# Patient Record
Sex: MALE | Race: WHITE
[De-identification: names, ages, dates, MRNs, and addresses within clinical notes are randomized per-mention and may not be internally consistent; named-entity substitution may affect disease eponyms.]

---

## 2020-08-15 ENCOUNTER — HOSPITAL ENCOUNTER (INPATIENT)
Dept: HOSPITAL 63 - GEROPSY | Age: 75
LOS: 11 days | Discharge: HOME | DRG: 885 | End: 2020-08-26
Attending: PSYCHIATRY & NEUROLOGY | Admitting: PSYCHIATRY & NEUROLOGY
Payer: OTHER GOVERNMENT

## 2020-08-15 VITALS — SYSTOLIC BLOOD PRESSURE: 115 MMHG | DIASTOLIC BLOOD PRESSURE: 55 MMHG

## 2020-08-15 VITALS — DIASTOLIC BLOOD PRESSURE: 55 MMHG | SYSTOLIC BLOOD PRESSURE: 110 MMHG

## 2020-08-15 VITALS — WEIGHT: 140.21 LBS | HEIGHT: 66 IN | BODY MASS INDEX: 22.53 KG/M2

## 2020-08-15 DIAGNOSIS — Z88.8: ICD-10-CM

## 2020-08-15 DIAGNOSIS — E78.5: ICD-10-CM

## 2020-08-15 DIAGNOSIS — N52.9: ICD-10-CM

## 2020-08-15 DIAGNOSIS — J44.9: ICD-10-CM

## 2020-08-15 DIAGNOSIS — I10: ICD-10-CM

## 2020-08-15 DIAGNOSIS — N40.0: ICD-10-CM

## 2020-08-15 DIAGNOSIS — R45.851: ICD-10-CM

## 2020-08-15 DIAGNOSIS — F33.3: Primary | ICD-10-CM

## 2020-08-15 DIAGNOSIS — F41.9: ICD-10-CM

## 2020-08-15 DIAGNOSIS — E11.9: ICD-10-CM

## 2020-08-15 DIAGNOSIS — Z85.828: ICD-10-CM

## 2020-08-15 DIAGNOSIS — I71.4: ICD-10-CM

## 2020-08-15 DIAGNOSIS — N28.9: ICD-10-CM

## 2020-08-15 DIAGNOSIS — Z79.899: ICD-10-CM

## 2020-08-15 DIAGNOSIS — Z20.828: ICD-10-CM

## 2020-08-15 LAB
ALBUMIN SERPL-MCNC: 3.6 G/DL (ref 3.4–5)
ALBUMIN/GLOB SERPL: 1 {RATIO} (ref 1–1.7)
ALP SERPL-CCNC: 85 U/L (ref 46–116)
ALT SERPL-CCNC: 21 U/L (ref 16–63)
ANION GAP SERPL CALC-SCNC: 10 MMOL/L (ref 6–14)
APTT PPP: YELLOW S
AST SERPL-CCNC: 17 U/L (ref 15–37)
BACTERIA #/AREA URNS HPF: 0 /HPF
BASOPHILS # BLD AUTO: 0 X10^3/UL (ref 0–0.2)
BASOPHILS NFR BLD: 0 % (ref 0–3)
BILIRUB SERPL-MCNC: 0.5 MG/DL (ref 0.2–1)
BILIRUB UR QL STRIP: (no result)
BUN/CREAT SERPL: 21 (ref 6–20)
CA-I SERPL ISE-MCNC: 30 MG/DL (ref 8–26)
CALCIUM SERPL-MCNC: 9 MG/DL (ref 8.5–10.1)
CHLORIDE SERPL-SCNC: 105 MMOL/L (ref 98–107)
CHOLEST/HDLC SERPL: 2 {RATIO}
CO2 SERPL-SCNC: 25 MMOL/L (ref 21–32)
CREAT SERPL-MCNC: 1.4 MG/DL (ref 0.7–1.3)
EOSINOPHIL NFR BLD: 0.1 X10^3/UL (ref 0–0.7)
EOSINOPHIL NFR BLD: 2 % (ref 0–3)
ERYTHROCYTE [DISTWIDTH] IN BLOOD BY AUTOMATED COUNT: 13.9 % (ref 11.5–14.5)
FIBRINOGEN PPP-MCNC: CLEAR MG/DL
GFR SERPLBLD BASED ON 1.73 SQ M-ARVRAT: 49.4 ML/MIN
GLOBULIN SER-MCNC: 3.5 G/DL (ref 2.2–3.8)
GLUCOSE SERPL-MCNC: 79 MG/DL (ref 70–99)
GLUCOSE UR STRIP-MCNC: (no result) MG/DL
HCT VFR BLD CALC: 45.4 % (ref 39–53)
HDLC SERPL-MCNC: 57 MG/DL (ref 40–60)
HGB BLD-MCNC: 15.3 G/DL (ref 13–17.5)
LDLC: 97 MG/DL (ref 0–100)
LYMPHOCYTES # BLD: 1.6 X10^3/UL (ref 1–4.8)
LYMPHOCYTES NFR BLD AUTO: 20 % (ref 24–48)
MAGNESIUM SERPL-MCNC: 2.1 MG/DL (ref 1.8–2.4)
MCH RBC QN AUTO: 33 PG (ref 25–35)
MCHC RBC AUTO-ENTMCNC: 34 G/DL (ref 31–37)
MCV RBC AUTO: 99 FL (ref 79–100)
MONO #: 0.9 X10^3/UL (ref 0–1.1)
MONOCYTES NFR BLD: 11 % (ref 0–9)
NEUT #: 5.5 X10^3UL (ref 1.8–7.7)
NEUTROPHILS NFR BLD AUTO: 67 % (ref 31–73)
NITRITE UR QL STRIP: (no result)
PLATELET # BLD AUTO: 111 X10^3/UL (ref 140–400)
POTASSIUM SERPL-SCNC: 4.2 MMOL/L (ref 3.5–5.1)
PROT SERPL-MCNC: 7.1 G/DL (ref 6.4–8.2)
RBC # BLD AUTO: 4.61 X10^6/UL (ref 4.3–5.7)
RBC #/AREA URNS HPF: (no result) /HPF (ref 0–2)
SODIUM SERPL-SCNC: 140 MMOL/L (ref 136–145)
SP GR UR STRIP: 1.02
SQUAMOUS #/AREA URNS LPF: (no result) /LPF
THYROID STIM HORMONE (TSH): 1.58 UIU/ML (ref 0.36–3.74)
TRIGL SERPL-MCNC: 84 MG/DL (ref 0–150)
UROBILINOGEN UR-MCNC: 0.2 MG/DL
VLDLC: 16 MG/DL (ref 0–40)
WBC # BLD AUTO: 8.2 X10^3/UL (ref 4–11)
WBC #/AREA URNS HPF: (no result) /HPF (ref 0–4)

## 2020-08-15 PROCEDURE — 81001 URINALYSIS AUTO W/SCOPE: CPT

## 2020-08-15 PROCEDURE — 84443 ASSAY THYROID STIM HORMONE: CPT

## 2020-08-15 PROCEDURE — 86592 SYPHILIS TEST NON-TREP QUAL: CPT

## 2020-08-15 PROCEDURE — 84480 ASSAY TRIIODOTHYRONINE (T3): CPT

## 2020-08-15 PROCEDURE — 83735 ASSAY OF MAGNESIUM: CPT

## 2020-08-15 PROCEDURE — 80053 COMPREHEN METABOLIC PANEL: CPT

## 2020-08-15 PROCEDURE — 82607 VITAMIN B-12: CPT

## 2020-08-15 PROCEDURE — 83036 HEMOGLOBIN GLYCOSYLATED A1C: CPT

## 2020-08-15 PROCEDURE — 85025 COMPLETE CBC W/AUTO DIFF WBC: CPT

## 2020-08-15 PROCEDURE — 93005 ELECTROCARDIOGRAM TRACING: CPT

## 2020-08-15 PROCEDURE — 80061 LIPID PANEL: CPT

## 2020-08-15 PROCEDURE — 82306 VITAMIN D 25 HYDROXY: CPT

## 2020-08-15 PROCEDURE — 83550 IRON BINDING TEST: CPT

## 2020-08-15 PROCEDURE — 83540 ASSAY OF IRON: CPT

## 2020-08-15 PROCEDURE — 84436 ASSAY OF TOTAL THYROXINE: CPT

## 2020-08-15 PROCEDURE — 82947 ASSAY GLUCOSE BLOOD QUANT: CPT

## 2020-08-15 PROCEDURE — 36415 COLL VENOUS BLD VENIPUNCTURE: CPT

## 2020-08-15 RX ADMIN — BUSPIRONE HYDROCHLORIDE SCH MG: 15 TABLET ORAL at 20:27

## 2020-08-15 NOTE — PDOC
Exam


Note:


Quan Note:


Please also refer to the separate dictated note~for this date of service 

dictated separately.~Patient seen individually. Discussed the patient with 

Nursing staff reviewed the chart.~Reviewed interim history and current 

functioning. Reviewed vital signs,~Labs/ Radiology~and current medications noted

below. Continue current treatment with the changes noted in the dictated 

addendum note





Assessment:


Vital Signs/I&O:





                                   Vital Signs








  Date Time  Temp Pulse Resp B/P (MAP) Pulse Ox O2 Delivery O2 Flow Rate FiO2


 


8/15/20 20:00 97.4       


 


8/15/20 15:45  66 16 110/55 (73) 94 Room Air  








Labs:





                                Laboratory Tests








Test


 8/15/20


11:35 8/15/20


11:50


 


White Blood Count


 8.2 x10^3/uL


(4.0-11.0) 





 


Red Blood Count


 4.61 x10^6/uL


(4.30-5.70) 





 


Hemoglobin


 15.3 g/dL


(13.0-17.5) 





 


Hematocrit


 45.4 %


(39.0-53.0) 





 


Mean Corpuscular Volume


 99 fL ()


 





 


Mean Corpuscular Hemoglobin 33 pg (25-35)   


 


Mean Corpuscular Hemoglobin


Concent 34 g/dL


(31-37) 





 


Red Cell Distribution Width


 13.9 %


(11.5-14.5) 





 


Platelet Count


 111 x10^3/uL


(140-400)  L 





 


Neutrophils (%) (Auto) 67 % (31-73)   


 


Lymphocytes (%) (Auto) 20 % (24-48)  L 


 


Monocytes (%) (Auto) 11 % (0-9)  H 


 


Eosinophils (%) (Auto) 2 % (0-3)   


 


Basophils (%) (Auto) 0 % (0-3)   


 


Neutrophils # (Auto)


 5.5 x10^3uL


(1.8-7.7) 





 


Lymphocytes # (Auto)


 1.6 x10^3/uL


(1.0-4.8) 





 


Monocytes # (Auto)


 0.9 x10^3/uL


(0.0-1.1) 





 


Eosinophils # (Auto)


 0.1 x10^3/uL


(0.0-0.7) 





 


Basophils # (Auto)


 0.0 x10^3/uL


(0.0-0.2) 





 


Sodium Level


 140 mmol/L


(136-145) 





 


Potassium Level


 4.2 mmol/L


(3.5-5.1) 





 


Chloride Level


 105 mmol/L


() 





 


Carbon Dioxide Level


 25 mmol/L


(21-32) 





 


Anion Gap 10 (6-14)   


 


Blood Urea Nitrogen


 30 mg/dL


(8-26)  H 





 


Creatinine


 1.4 mg/dL


(0.7-1.3)  H 





 


Estimated GFR


(Cockcroft-Gault) 49.4  


 





 


BUN/Creatinine Ratio 21 (6-20)  H 


 


Glucose Level


 79 mg/dL


(70-99) 





 


Calcium Level


 9.0 mg/dL


(8.5-10.1) 





 


Magnesium Level


 2.1 mg/dL


(1.8-2.4) 





 


Iron Level


 87 ug/dL


() 





 


Total Iron Binding Capacity


 288 ug/dL


(250-450) 





 


Iron Saturation 30 % (15-34)   


 


Total Bilirubin


 0.5 mg/dL


(0.2-1.0) 





 


Aspartate Amino Transferase


(AST) 17 U/L (15-37)


 





 


Alanine Aminotransferase (ALT)


 21 U/L (16-63)


 





 


Alkaline Phosphatase


 85 U/L


() 





 


Total Protein


 7.1 g/dL


(6.4-8.2) 





 


Albumin


 3.6 g/dL


(3.4-5.0) 





 


Albumin/Globulin Ratio 1.0 (1.0-1.7)   


 


Triglycerides Level


 84 mg/dL


(0-150) 





 


Cholesterol Level


 170 mg/dL


(0-200) 





 


LDL Cholesterol, Calculated


 97 mg/dL


(0-100) 





 


VLDL Cholesterol, Calculated


 16 mg/dL


(0-40) 





 


Non-HDL Cholesterol Calculated


 113 mg/dL


(0-129) 





 


HDL Cholesterol


 57 mg/dL


(40-60) 





 


Cholesterol/HDL Ratio 2.0   


 


Thyroid Stimulating Hormone


(TSH) 1.582 uIU/mL


(0.358-3.740) 





 


Urine Collection Type  Unknown  


 


Urine Color  Yellow  


 


Urine Clarity  Clear  


 


Urine pH  6.0  


 


Urine Specific Gravity  1.020  


 


Urine Protein


 


 Neg


(NEG-TRACE)


 


Urine Glucose (UA)


 


 Neg mg/dL


(NEG)


 


Urine Ketones (Stick)


 


 Neg mg/dL


(NEG)


 


Urine Blood  Trace (NEG)  


 


Urine Nitrite  Neg (NEG)  


 


Urine Bilirubin  Neg (NEG)  


 


Urine Urobilinogen Dipstick


 


 0.2 mg/dL (0.2


mg/dL)


 


Urine Leukocyte Esterase  Neg (NEG)  


 


Urine RBC


 


 Occ /HPF (0-2)





 


Urine WBC


 


 Occ /HPF (0-4)





 


Urine Squamous Epithelial


Cells 


 Occ /LPF  





 


Urine Bacteria


 


 0 /HPF (0-FEW)





 


Urine Mucus  Slight /LPF  











Current Medications:


Meds:





Current Medications








 Medications


  (Trade)  Dose


 Ordered  Sig/Ammy


 Route


 PRN Reason  Start Time


 Stop Time Status Last Admin


Dose Admin


 


 Lorazepam


  (Ativan)  0.5 mg  BID


 PO


   8/15/20 21:00


    8/15/20 20:27





 


 Buspirone HCl


  (Buspar)  30 mg  BID


 PO


   8/15/20 21:00


    8/15/20 20:27











I have reviewed the current psychotropics carefully including drug interactions.

 Risk benefit ratio favors no change other than as noted in my dictated progress

note.





Diagnosis:


Problems:  


(1) MDD (major depressive disorder)


(2) Anxiety disorder, unspecified











DAVE WAN MD                 Aug 15, 2020 22:29

## 2020-08-15 NOTE — CONS
DATE OF CONSULTATION:  08/15/2020



REASON FOR CONSULTATION:  Medical management.



HISTORY OF PRESENT ILLNESS:  The patient is a 74-year-old  male patient

who was referred [facility] from [facility] on account of being depressed, feeling antsy all the time, having

a total mental breakdown, cold sweats, sits in dark, fleeting thoughts of

suicide, difficulty concentration, all this in a background of major depressive

disorder.



PAST MEDICAL HISTORY:  Significant for male erectile dysfunction, essential

hypertension, hyperlipidemia, obstructive pulmonary disease, type 2 diabetes

mellitus, benign prostatic hypertrophy.  He has also abdominal aortic aneurysm.



PAST SURGICAL HISTORY:  Unremarkable except for removal of basal cell carcinoma

of his nose.



PAST PSYCHIATRIC HISTORY:  Significant for anxiety and depression.



ALLERGIES:  HE IS ALLERGIC TO LIPITOR AND ZETIA.



MEDICATIONS:  He is currently on following medications:  He is on Flomax 0.4 mg

daily, Crestor 40 mg at bedtime, lisinopril/hydrochlorothiazide 10/12.5 mg once

a day, aspirin 81 mg once a day, fluoxetine 10 mg daily, Abilify 2 mg daily,

lorazepam 0.5 mg twice a day, buspirone 30 mg twice a day, cholecalciferol 25

mcg once a day and multivitamin with mineral 1 tablet once a day.



FAMILY HISTORY:  Noncontributory.



SOCIAL HISTORY:  He is , lives with his wife.  He has 2 sons, 1  at

the age of 17 because of leukemia.  The other son lives in [facility] and has

3 sons and a daughter.  He is vaping, although trying to quit that.  He does not

drink alcohol or use recreational drugs.  He is retired and used to own a

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

equipment.



REVIEW OF SYSTEMS:  As per history of present illness.



PHYSICAL EXAMINATION

GENERAL:  When I saw him this afternoon, he was sitting comfortably in his

chair, in no apparent respiratory distress.  There was no pallor, jaundice,

cyanosis or thyromegaly.  No jugular venous distention.  No limb edema.

VITAL SIGNS:  His heart rate was 66, blood pressure was 110/55, temperature

97.1, respiratory rate was 16 and oxygen saturation was 94% on room air.

HEAD, EYES, EARS, NOSE AND THROAT:  Showed normocephalic, atraumatic.

NECK:  Supple.

HEART:  Showed normal first and second heart sounds.  No gallop, rub or murmur.

CHEST:  Clear to auscultation.  No crepitation or rhonchi.

ABDOMEN:  Distended, soft, nontender.  No guarding or rigidity.  No

organomegaly.  All hernial orifices intact.  Bowel sounds normal.

NEUROLOGIC:  He was awake, alert, responding appropriately.  All cranial nerves

intact.

EXTREMITIES:  He moves all extremities without difficulty, ambulates without

assistance or assistive devices.



LABORATORY DATA:  Showed white cell count of 8200, hemoglobin 15.3, hematocrit

45, MCV 99 and platelet count of 111,000.  His chemistry showed a serum sodium

140, potassium 4.2, chloride 105, bicarbonate 25, anion gap of 10, BUN 30,

creatinine 1.4, estimated GFR was 49 mL per minute.  His glucose was 79, calcium

was 9, magnesium was 2.1.  Serum iron was 87, TIBC was 288 and iron saturation

was 30.  His total bilirubin, AST, ALT, alkaline phosphatase were normal.  Total

protein was 7.1, albumin was 3.6.  His serum triglycerides were 84, total

cholesterol 170, LDL was 97, VLDL was 16, HDL was 57, the ratio was 2.  His TSH

was normal at 1.582.  His urinalysis showed the urine was yellow, clear with a

pH of 6, specific gravity of 1.020.  The urine was negative for protein,

glucose, ketones, trace of blood and negative for nitrite and bilirubin.  The

urine was negative for leukocyte esterase, occasional rbc's, occasional wbc's

and no bacteria.



IMPRESSION:  In summary, this is a 74-year-old  male patient who was

admitted on account of being depressed, feeling antsy all the time, having a

total mental breakdown, cold sweats, sits in the dark, fleeting thoughts of

suicide, difficulty concentrating, all this in a background of major depressive

disorder.  He has multiple medical problems including abdominal aortic aneurysm

that is about 5.5, hypertension, hyperlipidemia, chronic obstructive pulmonary

disease, type 2 diabetes and benign prostatic hypertrophy.  All in all, the

patient seemed to be medically stable.  All his vital signs are stable.  All his

lab work are within acceptable range.  He does have mild impaired kidney

function.



PLAN:  My plan is to obviously review all the labs that are still pending at the

time of this dictation and make any necessary recommendation.



Thank you, Dr. Meyer, for allowing me to participate in the care of this

patient.





______________________________

DEANN MORGAN MD



DR:  GIRISH/trerance  JOB#:  [id number] / [id number]

DD:  08/15/2020 16:38  DT:  08/15/2020 17:35

## 2020-08-15 NOTE — HP
ADMIT DATE:  08/15/2020



PSYCHIATRIC ADMISSION HISTORY/EVALUATION



This note covers elements not covered in my initial note 08/15/2020.



I met with the patient evening of 08/15/2020 on telehealth rounds with MIRLANDE Hu.  Previously discussed with Marie Cevallos, .



IDENTIFYING DATA:  The patient is a 74-year-old  male referred to us

from the [facility].  The patient resides at home, has been getting

increasingly depressed, anxious, and complains of feeling "antsy" all the

time.  He states he is having a "total mental breakdown."  He admits to having

cold sweats, sits in the dark, has fleeting suicidal thoughts, difficulty

concentrating.  His wife works at a flower shop during the day and he is alone

at home; ruminating, obsessive, depressed, hopeless, helpless, worthless.  [facility]

[facility]

referral.



CHIEF COMPLAINT:  "I am having a total mental breakdown."



HISTORY OF PRESENT ILLNESS:  The patient has a history of worsening symptoms of

depression, feeling hopeless, helpless, worthless, anxious with fleeting

suicidal ideation and obsessive thought processes.  No clear history of bipolar

disorder or homicidal ideation.  Cognitively, he has been reasonably intact.



PAST PSYCHIATRIC HISTORY:  The patient states he had a similar mental breakdown

many years ago.  He was treated on Ativan, did very well fairly quickly.



PAST MEDICAL HISTORY:  Positive for erectile dysfunction, abdominal aortic

aneurysm, basal cell carcinoma of the nose, hypertension, hyperlipidemia, COPD,

type 2 diabetes mellitus, BPH.



ALLERGIES:  To LIPITOR and ZETIA.



CODE STATUS:  Full code.



ACCU-CHEKS:  None.



DIET:  Regular.



Takes medications whole, ambulates up ad riki.



SOCIAL HISTORY:  No history of alcohol, drug abuse, physical, sexual or elder

abuse.  He is not known to be a perpetrator.  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

[employer] and would go to trade shows.



CURRENT PSYCHOTROPICS:  Abilify 2 mg a day, BuSpar 30 mg b.i.d., Prozac 10 mg a

day, Ativan 0.5 mg b.i.d.



FAMILY HISTORY:  Noncontributory.



REVIEW OF SYSTEMS:  Positive for anxiety, restlessness.  No CV, , pulmonary,

eye, ENT system symptoms on review.



MENTAL STATUS EXAMINATION:  The patient is reasonably oriented.  Speech is

coherent, abstraction fair, computation impaired, language function intact. 

Mood and affect is depressed, anxious.  No active suicidal ideation.  Attention

span is short.



IMPRESSION:  Major depressive disorder, recurrent; anxiety disorder,

unspecified.



PLAN:  Admit to Geropsychiatry Unit at [facility].  I will see the

patient daily individually from a psychiatric standpoint.  Medical followup with

Dr. Salgado/Dr. Blount.  Continue the patient on his current medications.  [facility]

[facility].  Consider increasing Prozac, stopping the

Abilify since he may be having some akathisia from this, maintaining BuSpar, may

consider using Seroquel for anxiety.  We will make further adjustments as

clinically indicated.



ESTIMATED LENGTH OF STAY:  10-12 days.



DISPOSITION:  Pl[facility] back [facility] when [facility].





______________________________

MAN SHAYNA WAN MD



DR:  ESPERANZA/terrance  JOB#:  [id number] / [id number]

DD:  08/15/2020 18:54  DT:  08/15/2020 19:28

## 2020-08-16 VITALS — SYSTOLIC BLOOD PRESSURE: 118 MMHG | DIASTOLIC BLOOD PRESSURE: 65 MMHG

## 2020-08-16 VITALS — SYSTOLIC BLOOD PRESSURE: 97 MMHG | DIASTOLIC BLOOD PRESSURE: 57 MMHG

## 2020-08-16 LAB
HBA1C MFR BLD: 6.2 % (ref 4.8–5.6)
T3 SERPL-MCNC: 78 NG/DL (ref 71–180)
T4 SERPL-MCNC: 5.8 UG/DL (ref 4.5–12)

## 2020-08-16 RX ADMIN — TAMSULOSIN HYDROCHLORIDE SCH MG: 0.4 CAPSULE ORAL at 07:50

## 2020-08-16 RX ADMIN — ASPIRIN SCH MG: 81 TABLET, COATED ORAL at 07:51

## 2020-08-16 RX ADMIN — BUSPIRONE HYDROCHLORIDE SCH MG: 15 TABLET ORAL at 07:48

## 2020-08-16 RX ADMIN — LISINOPRIL SCH MG: 10 TABLET ORAL at 07:51

## 2020-08-16 RX ADMIN — VITAMIN D, TAB 1000IU (100/BT) SCH UNIT: 25 TAB at 07:51

## 2020-08-16 RX ADMIN — I-VITE, TAB 1000-60-2MG (60/BT) SCH TAB: TAB at 07:51

## 2020-08-16 NOTE — PDOC
Exam


Note:


Quan Note:


Please also refer to the separate dictated note~for this date of service 

dictated separately.~Patient seen individually. Discussed the patient with 

Nursing staff reviewed the chart.~Reviewed interim history and current 

functioning. Reviewed vital signs,~Labs/ Radiology~and current medications noted

below. Continue current treatment with the changes noted in the dictated 

addendum note





Assessment:


Vital Signs/I&O:





                                   Vital Signs








  Date Time  Temp Pulse Resp B/P (MAP) Pulse Ox O2 Delivery O2 Flow Rate FiO2


 


8/16/20 15:53 97.4 71 16 97/57 (70) 93 Room Air  














                                    I & O   


 


 8/15/20 8/15/20 8/16/20





 15:00 23:00 07:00


 


Intake Total 180 ml 360 ml 


 


Balance 180 ml 360 ml 











Current Medications:


Meds:





Current Medications








 Medications


  (Trade)  Dose


 Ordered  Sig/Ammy


 Route


 PRN Reason  Start Time


 Stop Time Status Last Admin


Dose Admin


 


 Aripiprazole


  (Abilify)  2 mg  DAILY


 PO


   8/16/20 09:00


 8/16/20 18:28 DC 8/16/20 07:51





 


 Aspirin


  (Aspirin Enteric


 Coated)  81 mg  DAILY


 PO


   8/16/20 09:00


    8/16/20 07:51





 


 Vitamin D


  (Vitamin D3)  1,000 unit  DAILY


 PO


   8/16/20 09:00


    8/16/20 07:51





 


 Fluoxetine HCl


  (PROzac)  10 mg  DAILY


 PO


   8/16/20 09:00


 8/16/20 18:29 DC 8/16/20 07:51





 


 Tamsulosin HCl


  (Flomax)  0.4 mg  DAILY


 PO


   8/16/20 09:00


    8/16/20 07:50





 


 Multivitamins/


 Minerals


  (I-Terrence)  1 tab  DAILY


 PO


   8/16/20 09:00


    8/16/20 07:51





 


 Lisinopril


  (Prinivil)  10 mg  DAILY


 PO


   8/16/20 09:00


    8/16/20 07:51





 


 Hydrochlorothiazide


  (Microzide)  12.5 mg  DAILY


 PO


   8/16/20 09:00


    8/16/20 07:51











I have reviewed the current psychotropics carefully including drug interactions.

 Risk benefit ratio favors no change other than as noted in my dictated progress

note.





Diagnosis:


Problems:  


(1) Anxiety disorder, unspecified


(2) MDD (major depressive disorder)











DAVE WAN MD                 Aug 16, 2020 21:58

## 2020-08-17 VITALS — SYSTOLIC BLOOD PRESSURE: 106 MMHG | DIASTOLIC BLOOD PRESSURE: 57 MMHG

## 2020-08-17 VITALS — DIASTOLIC BLOOD PRESSURE: 54 MMHG | SYSTOLIC BLOOD PRESSURE: 112 MMHG

## 2020-08-17 RX ADMIN — VITAMIN D, TAB 1000IU (100/BT) SCH UNIT: 25 TAB at 09:15

## 2020-08-17 RX ADMIN — I-VITE, TAB 1000-60-2MG (60/BT) SCH TAB: TAB at 09:13

## 2020-08-17 RX ADMIN — ASPIRIN SCH MG: 81 TABLET, COATED ORAL at 09:13

## 2020-08-17 RX ADMIN — LISINOPRIL SCH MG: 10 TABLET ORAL at 09:00

## 2020-08-17 RX ADMIN — TAMSULOSIN HYDROCHLORIDE SCH MG: 0.4 CAPSULE ORAL at 09:13

## 2020-08-17 NOTE — PDOC
Exam


Note:


Quan Note:


Please also refer to the separate dictated note~for this date of service 

dictated separately.~Patient seen individually. Discussed the patient with 

Nursing staff reviewed the chart.~Reviewed interim history and current 

functioning. Reviewed vital signs,~Labs/ Radiology~and current medications noted

below. Continue current treatment with the changes noted in the dictated 

addendum note





Assessment:


Vital Signs/I&O:





                                   Vital Signs








  Date Time  Temp Pulse Resp B/P (MAP) Pulse Ox O2 Delivery O2 Flow Rate FiO2


 


8/17/20 20:08 97.8    95   


 


8/17/20 15:45  67 16 112/54 (73)    


 


8/16/20 15:53      Room Air  














                                    I & O   


 


 8/16/20 8/16/20 8/17/20





 15:00 23:00 07:00


 


Intake Total 600 ml 360 ml 


 


Balance 600 ml 360 ml 








Labs:





                                Laboratory Tests








Test


 8/17/20


07:56


 


Glucose (Fingerstick)


 79 mg/dL


(70-99)











Current Medications:


Meds:





Current Medications








 Medications


  (Trade)  Dose


 Ordered  Sig/Ammy


 Route


 PRN Reason  Start Time


 Stop Time Status Last Admin


Dose Admin


 


 Aripiprazole


  (Abilify)  1 mg  DAILY


 PO


   8/17/20 09:00


    8/17/20 09:17





 


 Fluoxetine HCl


  (PROzac)  20 mg  DAILY


 PO


   8/17/20 09:00


    8/17/20 09:18











I have reviewed the current psychotropics carefully including drug interactions.

 Risk benefit ratio favors no change other than as noted in my dictated progress

note.





Diagnosis:


Problems:  


(1) Anxiety disorder, unspecified


(2) MDD (major depressive disorder)











DAVE WAN MD                 Aug 17, 2020 22:10

## 2020-08-17 NOTE — PN
DATE:  08/16/2020



PSYCHIATRIC PROGRESS NOTE



This late entry 08/16/2020 covers elements not covered in my initial note.



SUBJECTIVE:  I met with the patient evening of 08/16/2020 on telehealth rounds

with MIRLANDE Hu.  The patient slept 6-3/4 hours previous night.  He has been

somewhat withdrawn, isolative, spends much time in his room.  Nursing staff

offered him Renetta to listen to music and explore other activities he might be

interested in, but he showed little interest.  [facility],

[facility]iatry as well.  His wife called and talked to him.  He does complain of a

hand tremor, but the akathisia perhaps is a little better since we reduced the

Abilify.  Explored his history at further length.  While home, he and his wife

is working during the day.  He isolates himself at home, fairly withdrawn in a

dark room.  We addressed ways to change this.



REVIEW OF SYSTEMS:  No CV, , pulmonary, eye system symptoms on review.



MENTAL STATUS EXAMINATION:  The patient is reasonably oriented.  Speech is

coherent, has some latency.  Abstraction fair, computation impaired, language

function intact, attention span short.  Mood and affect is depressed.  No

suicidal ideation.



LABORATORY DATA:  Reviewed.



IMPRESSION:  Major depressive disorder; anxiety disorder, unspecified.  Rest

unchanged.



PLAN:  Reduce Abilify from 2 mg a day down to 1 mg a day.  Stop the BuSpar,

increase Prozac from 10 mg a day to 20 mg a day.  Maintain Ativan 0.5 mg b.i.d. 

Adjust further as clinically indicated.





______________________________

MAN SHAYNA WAN MD



DR:  ESPERANZA/terrance  JOB#:  [id number] / [id number]

DD:  08/17/2020 17:26  DT:  08/17/2020 19:15

## 2020-08-18 VITALS — DIASTOLIC BLOOD PRESSURE: 48 MMHG | SYSTOLIC BLOOD PRESSURE: 90 MMHG

## 2020-08-18 VITALS — SYSTOLIC BLOOD PRESSURE: 108 MMHG | DIASTOLIC BLOOD PRESSURE: 62 MMHG

## 2020-08-18 RX ADMIN — VITAMIN D, TAB 1000IU (100/BT) SCH UNIT: 25 TAB at 09:25

## 2020-08-18 RX ADMIN — TAMSULOSIN HYDROCHLORIDE SCH MG: 0.4 CAPSULE ORAL at 09:25

## 2020-08-18 RX ADMIN — I-VITE, TAB 1000-60-2MG (60/BT) SCH TAB: TAB at 09:26

## 2020-08-18 RX ADMIN — ASPIRIN SCH MG: 81 TABLET, COATED ORAL at 09:24

## 2020-08-18 NOTE — PDOC
Exam


Note:


Quan Note:


Please also refer to the separate dictated note~for this date of service 

dictated separately.~Patient seen individually. Discussed the patient with 

Nursing staff reviewed the chart.~Reviewed interim history and current 

functioning. Reviewed vital signs,~Labs/ Radiology~and current medications noted

below. Continue current treatment with the changes noted in the dictated 

addendum note





Assessment:


Vital Signs/I&O:





                                   Vital Signs








  Date Time  Temp Pulse Resp B/P (MAP) Pulse Ox O2 Delivery O2 Flow Rate FiO2


 


8/18/20 15:14 97.9 66 16 90/48 (62) 95   


 


8/16/20 15:53      Room Air  














                                    I & O   


 


 8/17/20 8/17/20 8/18/20





 15:00 23:00 07:00


 


Intake Total 480 ml 460 ml 


 


Balance 480 ml 460 ml 











Current Medications:


I have reviewed the current psychotropics carefully including drug interactions.

 Risk benefit ratio favors no change other than as noted in my dictated progress

note.





Diagnosis:


Problems:  


(1) Anxiety disorder, unspecified


(2) MDD (major depressive disorder)











DAVE WAN MD                 Aug 18, 2020 21:54

## 2020-08-19 VITALS — SYSTOLIC BLOOD PRESSURE: 133 MMHG | DIASTOLIC BLOOD PRESSURE: 56 MMHG

## 2020-08-19 VITALS — DIASTOLIC BLOOD PRESSURE: 49 MMHG | SYSTOLIC BLOOD PRESSURE: 90 MMHG

## 2020-08-19 VITALS — DIASTOLIC BLOOD PRESSURE: 56 MMHG | SYSTOLIC BLOOD PRESSURE: 96 MMHG

## 2020-08-19 RX ADMIN — ASPIRIN SCH MG: 81 TABLET, COATED ORAL at 10:12

## 2020-08-19 RX ADMIN — TAMSULOSIN HYDROCHLORIDE SCH MG: 0.4 CAPSULE ORAL at 10:11

## 2020-08-19 RX ADMIN — VITAMIN D, TAB 1000IU (100/BT) SCH UNIT: 25 TAB at 10:13

## 2020-08-19 RX ADMIN — I-VITE, TAB 1000-60-2MG (60/BT) SCH TAB: TAB at 10:12

## 2020-08-19 NOTE — PN
DATE:  08/18/2020



PSYCHIATRIC PROGRESS NOTE



This late entry 08/18/2020 covers elements not covered in my initial note.



SUBJECTIVE:  I met with the patient evening of 08/18/2020 on telehealth rounds. 

Discussed with MIRLANDE Marte.  The patient slept 7 hours previous night.  He has

been coming out of his room, walking in the hallway, an improvement for him.  No

suicidal ideation.  Reviewed information from the patient's wife who expressed

concern that prior to admission, she felt he could hurt himself, especially when

he was at home during the day alone and she was at work at the flower shop.  He

denies active suicidal ideation on questioning.



REVIEW OF SYSTEMS:  No CV, , pulmonary, eye system symptoms on review.



MENTAL STATUS EXAM:  Reasonably oriented.  Speech is coherent, has some latency.

 Abstraction fair, computation reasonable, language function intact.  Mood and

affect still depressed, but improved.  No suicidal ideation.  We discussed at

length how to increase social interactions despite isolation consequent to the

recent pandemic including using Zoom calls with multiple friends.  He states he

is interested in [employer] cars and goes to car shows as well and was able to

discuss some of his interests individually.



LABORATORY DATA:  Reviewed.



IMPRESSION:  Major depressive disorder in partial remission; anxiety disorder,

unspecified.



PLAN:  Continue psychotropics from initial note.  Prozac 20 mg a day, Abilify 1

mg a day, Ativan 0.5 mg b.i.d., await records from [facility].  Rest unchanged

for now.





______________________________

DAVE WAN MD



DR:  ESPERANZA/terrance  JOB#:  [id number] / [id number]

DD:  08/19/2020 10:50  DT:  08/19/2020 12:53

## 2020-08-19 NOTE — PN
DATE:  08/17/2020



PSYCHIATRIC PROGRESS NOTE



This late entry 08/17/2020 covers elements not covered in my initial note.



SUBJECTIVE:  I met with the patient evening of 08/17/2020 at length earlier in

the day.  The patient was reviewed at treatment team meeting with Marie Cevallos, social service and MIRLANDE Villasenor.  We will obtain records from the [facility] for the past psychotropic medication interventions.  Appetite remains

100%.



REVIEW OF SYSTEMS:  Positive for some tiredness.  No CV, , pulmonary, eye

system symptoms on review.



MENTAL STATUS EXAM:  Oriented reasonably.  Speech is coherent, abstraction fair,

computation impaired, language function intact, attention span short.  Mood and

affect still depressed, but showing improvement and he has been coming out of

his room a little bit more.  No suicidal ideation.



LABORATORY DATA:  Reviewed.



IMPRESSION:  Major depressive disorder, recurrent.  Akathisia appears to be

improving.  Rest unchanged.



PLAN:  Continue the increased Prozac 20 mg a day and we stopped the BuSpar,

reduce the Abilify from 2 mg a day to 1 mg a day, consequent to akathisia. 

Maintain Ativan 0.5 b.i.d.  Continue rest unchanged.  Adjust gradually as

clinically indicated.





______________________________

DAVE WAN MD DR:  ESPERANZA/terrance  JOB#:  [id number] / [id number]

DD:  08/19/2020 10:47  DT:  08/19/2020 12:59

## 2020-08-19 NOTE — PDOC
Exam


Note:


Quan Note:


Please also refer to the separate dictated note~for this date of service 

dictated separately.~Patient seen individually. Discussed the patient with 

Nursing staff reviewed the chart.~Reviewed interim history and current 

functioning. Reviewed vital signs,~Labs/ Radiology~and current medications noted

below. Continue current treatment with the changes noted in the dictated 

addendum note





Assessment:


Vital Signs/I&O:





                                   Vital Signs








  Date Time  Temp Pulse Resp B/P (MAP) Pulse Ox O2 Delivery O2 Flow Rate FiO2


 


8/19/20 21:06 97.6    93   


 


8/19/20 20:24  64  133/56 (81)    


 


8/19/20 15:49   16     


 


8/16/20 15:53      Room Air  














                                    I & O   


 


 8/18/20 8/18/20 8/19/20





 15:00 23:00 07:00


 


Intake Total 820 ml 400 ml 


 


Balance 820 ml 400 ml 











Current Medications:


I have reviewed the current psychotropics carefully including drug interactions.

 Risk benefit ratio favors no change other than as noted in my dictated progress

note.





Diagnosis:


Problems:  


(1) Anxiety disorder, unspecified


(2) MDD (major depressive disorder)











DAVE WAN MD                 Aug 19, 2020 22:00

## 2020-08-20 VITALS — DIASTOLIC BLOOD PRESSURE: 71 MMHG | SYSTOLIC BLOOD PRESSURE: 120 MMHG

## 2020-08-20 VITALS — SYSTOLIC BLOOD PRESSURE: 109 MMHG | DIASTOLIC BLOOD PRESSURE: 68 MMHG

## 2020-08-20 VITALS — SYSTOLIC BLOOD PRESSURE: 114 MMHG | DIASTOLIC BLOOD PRESSURE: 58 MMHG

## 2020-08-20 RX ADMIN — ASPIRIN SCH MG: 81 TABLET, COATED ORAL at 09:00

## 2020-08-20 RX ADMIN — VITAMIN D, TAB 1000IU (100/BT) SCH UNIT: 25 TAB at 08:59

## 2020-08-20 RX ADMIN — I-VITE, TAB 1000-60-2MG (60/BT) SCH TAB: TAB at 08:59

## 2020-08-20 NOTE — PDOC
Exam


Note:


Quan Note:


Please also refer to the separate dictated note~for this date of service 

dictated separately.~Patient seen individually. Discussed the patient with 

Nursing staff reviewed the chart.~Reviewed interim history and current 

functioning. Reviewed vital signs,~Labs/ Radiology~and current medications noted

below. Continue current treatment with the changes noted in the dictated 

addendum note





Assessment:


Vital Signs/I&O:





                                   Vital Signs








  Date Time  Temp Pulse Resp B/P (MAP) Pulse Ox O2 Delivery O2 Flow Rate FiO2


 


8/20/20 20:31 97.5 61 16 120/71 (87) 95   


 


8/16/20 15:53      Room Air  














                                    I & O   


 


 8/19/20 8/19/20 8/20/20





 15:00 23:00 07:00


 


Intake Total 440 ml 480 ml 


 


Balance 440 ml 480 ml 











Current Medications:


Meds:





Current Medications








 Medications


  (Trade)  Dose


 Ordered  Sig/Ammy


 Route


 PRN Reason  Start Time


 Stop Time Status Last Admin


Dose Admin


 


 Fluoxetine HCl


  (PROzac)  30 mg  DAILY


 PO


   8/20/20 09:00


    8/20/20 08:59











I have reviewed the current psychotropics carefully including drug interactions.

 Risk benefit ratio favors no change other than as noted in my dictated progress

note.





Diagnosis:


Problems:  


(1) Anxiety disorder, unspecified


(2) MDD (major depressive disorder)











DAVE WAN MD                 Aug 20, 2020 22:04

## 2020-08-20 NOTE — TX PLAN
Interdisciplinary Tx Plan


Admission Information


Aug 15, 2020 at 10:03


Legal Status (on Admission):  Voluntary


DPOA/Guardian Name:  Pt is his own person


Contact Phone Number:  [phone]


Other Contact Name:  Erika Alberts


Other Contact Phone:  [phone]


Verified Code Status:  Full Code


Allergies:  


Coded Allergies:  


     atorvastatin (Verified  Allergy, Unknown, 8/15/20)


     ezetimibe (Verified  Allergy, Unknown, 8/15/20)





Diagnoses


Primary Diagnosis:  


MDD recurrent, severe


Reasons for Admission:  Depressed, Suicidal ideation, Other


Problem in Patient's Words:  


He has always suffered from depression.  But this  


is the worst I have seen  him


Additional Admission Comments:  


According to the intake, pt is very depressed and  


feels "antsy" all the time.  Pt is having a  


"total mental breakdown", cold sweats, sits in  


the dark, fleeting thoughts of suicide, difficult  


to concentrate, decreased energy.





Problems


Active Problems:  


Withdrawn


flat affect


anxiety


difficulty concentrating


Inactive Problems:  


Medication compliance





Pt Strengths/Limitations


Ability for [place]:  Fair


Cognitive Functioning/Ability:  Fair


Communication Skills/Ability:  Fair


Financial Resources:  Fair


Insight/Judgement:  Fair


Intellectual Ability:  Fair


Physical Health:  Fair


Social Skills:  Fair


Stability in Family:  Good


Stability in School/Work:  Fair


Verbal Skills:  Good





Discharge Criteria


Discharge Criteria:  Able meet basic life need, Able to meet health needs, 

Adequate arrangements @DC, Verbal commit aftercare, Adequate self-care, Improved

behavior, Improved mood/thought





Preliminary Discharge Plan


Preliminary DC Plan:  Current Living Arrange.





Special Precautions


Fall Risk:  Low





Initial D/C Plan





Pt will plan to discharge home with wife and continued [facility]


Identified Discharge Needs:  


Primary Care follow-up


Psychiatrist


Counseling





Currently Utilized Resources


Currently Utilized Resources/P:  


PCP


[facility]


Referrals Community Resources:  


Counseling





Identified Problems/Hx/Goals


Objectives/Short-Term Goals


Short Term Goals:  Decrease Isolation, Dec. Symp. Depression, Medication 

Stabilization, Promote Coping Skill


Short Term Goals in Patient's:  


Decrease suicidality and get out of here so I can get back to normal





Interventions/Frequency


Staff Interventions/Frequency&:  


Psychiatrist to assess pt at least 3x per week.


 to assess pt at least 2x per week.


Nursing to assess behavior, medications and complete 15 minute checks


Encourage group participation or 1:1 engagement based of Activity Dept.


assessment.





History


Vocational History:  


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


Education:  


Pt graduated high school (12th grade)





Community Follow-up





Continue all services with the [facility]


Community Provider/Family Inpu:  


Concerns of potential Dementia as he tends to have bizarre behavior around


1500 according to pt wife.





Treatment Plan Explained


Patient/Representative had this treatment plan explained to him/her as indicated

by the signature below and


has been given the opportunity to ask questions and make suggestions:











Date:  





Patient/Representative Signature: _____________________________________________


Patient/Representative Decline:  JONATHAN Saeed                  Aug 20, 2020 12:28

## 2020-08-21 VITALS — DIASTOLIC BLOOD PRESSURE: 54 MMHG | SYSTOLIC BLOOD PRESSURE: 116 MMHG

## 2020-08-21 VITALS — SYSTOLIC BLOOD PRESSURE: 111 MMHG | DIASTOLIC BLOOD PRESSURE: 54 MMHG

## 2020-08-21 RX ADMIN — ASPIRIN SCH MG: 81 TABLET, COATED ORAL at 08:08

## 2020-08-21 RX ADMIN — I-VITE, TAB 1000-60-2MG (60/BT) SCH TAB: TAB at 08:08

## 2020-08-21 RX ADMIN — VITAMIN D, TAB 1000IU (100/BT) SCH UNIT: 25 TAB at 08:08

## 2020-08-21 NOTE — PDOC
Exam


Note:


Quan Note:


Please also refer to the separate dictated note~for this date of service 

dictated separately.~Patient seen individually. Discussed the patient with 

Nursing staff reviewed the chart.~Reviewed interim history and current 

functioning. Reviewed vital signs,~Labs/ Radiology~and current medications noted

below. Continue current treatment with the changes noted in the dictated 

addendum note





Assessment:


Vital Signs/I&O:





                                   Vital Signs








  Date Time  Temp Pulse Resp B/P (MAP) Pulse Ox O2 Delivery O2 Flow Rate FiO2


 


8/21/20 16:15 99.1 64 16 116/54 (74) 94   


 


8/16/20 15:53      Room Air  














                                    I & O   


 


 8/20/20 8/20/20 8/21/20





 15:00 23:00 07:00


 


Intake Total 600 ml 720 ml 


 


Balance 600 ml 720 ml 











Current Medications:


I have reviewed the current psychotropics carefully including drug interactions.

 Risk benefit ratio favors no change other than as noted in my dictated progress

note.





Diagnosis:


Problems:  


(1) Anxiety disorder, unspecified


(2) MDD (major depressive disorder)











DAVE WAN MD                 Aug 21, 2020 22:19

## 2020-08-21 NOTE — PN
DATE:  08/21/2020



PSYCHIATRIC PROGRESS NOTE



This is a late entry.



SUBJECTIVE:  The patient was seen on rounds evening of 08/21/2020.  Discussed

with MIRLANDE Hu.  Overall, the patient has been coming out of his room a little

bit more.  He slept 7 hours previous night, gets more anxious in the evening. 

Denies suicidal ideation.  He had low-grade temperature, but we will monitor

this and I will defer to Dr. Blount.



During the individual visit, we discussed at length his hobbies of [employer] cars

and attending car shows and his wife goes with him.  He states she enjoys

quilting and arts, reading, but they do spend time together and go out to eat

frequently.



REVIEW OF SYSTEMS:  No CV, , pulmonary, eye system symptoms on review.



MENTAL STATUS EXAM:  Reasonably oriented.  Speech is coherent, abstraction fair,

computation impaired, language function intact.  Mood and affect is improved.  



LABORATORY DATA:  Reviewed.



IMPRESSION:  Unchanged from initial note.



PLAN:  No change from initial note.





______________________________

MAN SHAYNA WAN MD



DR:  ESPERANZA/terrance  JOB#:  [id number] / [id number]

DD:  08/21/2020 22:16  DT:  08/21/2020 22:22

## 2020-08-21 NOTE — PN
DATE:  08/19/2020



PSYCHIATRIC PROGRESS NOTE



This late entry 08/19/2020 covers elements not covered in my initial note.



SUBJECTIVE:  I met with the patient evening of 08/19/2020.  Overall, the patient

has been  little less depressed coming out more from his room.  Denies active

suicidal ideation.



REVIEW OF SYSTEMS:  No CV, , pulmonary, eye, ENT system symptoms on review.



MENTAL STATUS EXAM:  Oriented reasonably.  Speech is coherent, abstraction fair,

computation impaired, language function intact, attention span short.  Mood and

affect still depressed, but improved.  Wife has shared with nursing staff her

concern that patient could have hurt himself prior to admission while he was at

home alone and she was at work at the flower shop.  Currently, denies suicidal

ideation.



LABORATORY DATA:  Reviewed.



IMPRESSION:  Unchanged from initial note.



PLAN:  No change from initial note.  Prozac is at 30 mg a day.  We may need to

increase it in due course.





______________________________

DAVE AWN MD



DR:  ESPERANZA/terrance  JOB#:  [id number] / [id number]

DD:  08/21/2020 22:13  DT:  08/21/2020 22:16

## 2020-08-21 NOTE — PN
DATE:  08/20/2020



PSYCHIATRIC PROGRESS NOTE



This late entry 08/20/2020 covers elements not covered in my initial note.



SUBJECTIVE:  I met with the patient evening of 08/20/2020 on telehealth rounds. 

The patient has been less depressed, coming out of his room more, slightly more

interactive.  Rest of the time he does withdraw to his room.



REVIEW OF SYSTEMS:  No CV, , pulmonary, eye system symptoms on review. 

Reliability fair.



MENTAL STATUS EXAM:  Reasonably oriented.  Speech is coherent, abstraction fair,

computation impaired, language function intact.  Mood and affect still

depressed, anxious, but better than before.  No suicidal ideation.



LABORATORY DATA:  Reviewed.



IMPRESSION:  Unchanged from initial note.



PLAN:  No change from initial note.





______________________________

MAN SHAYNA WAN MD



DR:  ESPERANZA/[facility]rance  JOB#:  [id number] / [id number]

DD:  08/21/2020 22:14  DT:  08/21/2020 22:19

## 2020-08-22 VITALS — DIASTOLIC BLOOD PRESSURE: 51 MMHG | SYSTOLIC BLOOD PRESSURE: 123 MMHG

## 2020-08-22 VITALS — SYSTOLIC BLOOD PRESSURE: 134 MMHG | DIASTOLIC BLOOD PRESSURE: 68 MMHG

## 2020-08-22 LAB
ALBUMIN SERPL-MCNC: 2.9 G/DL (ref 3.4–5)
ALBUMIN/GLOB SERPL: 0.9 {RATIO} (ref 1–1.7)
ALP SERPL-CCNC: 80 U/L (ref 46–116)
ALT SERPL-CCNC: 17 U/L (ref 16–63)
ANION GAP SERPL CALC-SCNC: 5 MMOL/L (ref 6–14)
AST SERPL-CCNC: 16 U/L (ref 15–37)
BASOPHILS # BLD AUTO: 0 X10^3/UL (ref 0–0.2)
BASOPHILS NFR BLD: 0 % (ref 0–3)
BILIRUB SERPL-MCNC: 0.5 MG/DL (ref 0.2–1)
BUN/CREAT SERPL: 19 (ref 6–20)
CA-I SERPL ISE-MCNC: 23 MG/DL (ref 8–26)
CALCIUM SERPL-MCNC: 8.5 MG/DL (ref 8.5–10.1)
CHLORIDE SERPL-SCNC: 108 MMOL/L (ref 98–107)
CO2 SERPL-SCNC: 29 MMOL/L (ref 21–32)
CREAT SERPL-MCNC: 1.2 MG/DL (ref 0.7–1.3)
EOSINOPHIL NFR BLD: 0.2 X10^3/UL (ref 0–0.7)
EOSINOPHIL NFR BLD: 2 % (ref 0–3)
ERYTHROCYTE [DISTWIDTH] IN BLOOD BY AUTOMATED COUNT: 13.9 % (ref 11.5–14.5)
GFR SERPLBLD BASED ON 1.73 SQ M-ARVRAT: 59 ML/MIN
GLOBULIN SER-MCNC: 3.2 G/DL (ref 2.2–3.8)
GLUCOSE SERPL-MCNC: 91 MG/DL (ref 70–99)
HCT VFR BLD CALC: 40.5 % (ref 39–53)
HGB BLD-MCNC: 13.9 G/DL (ref 13–17.5)
LYMPHOCYTES # BLD: 2 X10^3/UL (ref 1–4.8)
LYMPHOCYTES NFR BLD AUTO: 28 % (ref 24–48)
MCH RBC QN AUTO: 34 PG (ref 25–35)
MCHC RBC AUTO-ENTMCNC: 34 G/DL (ref 31–37)
MCV RBC AUTO: 98 FL (ref 79–100)
MONO #: 0.9 X10^3/UL (ref 0–1.1)
MONOCYTES NFR BLD: 12 % (ref 0–9)
NEUT #: 4.2 X10^3UL (ref 1.8–7.7)
NEUTROPHILS NFR BLD AUTO: 58 % (ref 31–73)
PLATELET # BLD AUTO: 119 X10^3/UL (ref 140–400)
POTASSIUM SERPL-SCNC: 4.4 MMOL/L (ref 3.5–5.1)
PROT SERPL-MCNC: 6.1 G/DL (ref 6.4–8.2)
RBC # BLD AUTO: 4.11 X10^6/UL (ref 4.3–5.7)
SODIUM SERPL-SCNC: 142 MMOL/L (ref 136–145)
WBC # BLD AUTO: 7.3 X10^3/UL (ref 4–11)

## 2020-08-22 RX ADMIN — I-VITE, TAB 1000-60-2MG (60/BT) SCH TAB: TAB at 08:45

## 2020-08-22 RX ADMIN — ASPIRIN SCH MG: 81 TABLET, COATED ORAL at 08:45

## 2020-08-22 RX ADMIN — VITAMIN D, TAB 1000IU (100/BT) SCH UNIT: 25 TAB at 08:45

## 2020-08-22 NOTE — PDOC
Exam


Note:


Quan Note:


Please also refer to the separate dictated note~for this date of service 

dictated separately.~Patient seen individually. Discussed the patient with 

Nursing staff reviewed the chart.~Reviewed interim history and current 

functioning. Reviewed vital signs,~Labs/ Radiology~and current medications noted

below. Continue current treatment with the changes noted in the dictated 

addendum note





Assessment:


Vital Signs/I&O:





                                   Vital Signs








  Date Time  Temp Pulse Resp B/P (MAP) Pulse Ox O2 Delivery O2 Flow Rate FiO2


 


8/22/20 16:16 98.2 63 19 134/68 (90) 95 Room Air  














                                    I & O   


 


 8/21/20 8/21/20 8/22/20





 15:00 23:00 07:00


 


Intake Total 240 ml 720 ml 


 


Balance 240 ml 720 ml 








Labs:





                                Laboratory Tests








Test


 8/22/20


06:18


 


White Blood Count


 7.3 x10^3/uL


(4.0-11.0)


 


Red Blood Count


 4.11 x10^6/uL


(4.30-5.70)  L


 


Hemoglobin


 13.9 g/dL


(13.0-17.5)


 


Hematocrit


 40.5 %


(39.0-53.0)


 


Mean Corpuscular Volume


 98 fL ()





 


Mean Corpuscular Hemoglobin 34 pg (25-35)  


 


Mean Corpuscular Hemoglobin


Concent 34 g/dL


(31-37)


 


Red Cell Distribution Width


 13.9 %


(11.5-14.5)


 


Platelet Count


 119 x10^3/uL


(140-400)  L


 


Neutrophils (%) (Auto) 58 % (31-73)  


 


Lymphocytes (%) (Auto) 28 % (24-48)  


 


Monocytes (%) (Auto) 12 % (0-9)  H


 


Eosinophils (%) (Auto) 2 % (0-3)  


 


Basophils (%) (Auto) 0 % (0-3)  


 


Neutrophils # (Auto)


 4.2 x10^3uL


(1.8-7.7)


 


Lymphocytes # (Auto)


 2.0 x10^3/uL


(1.0-4.8)


 


Monocytes # (Auto)


 0.9 x10^3/uL


(0.0-1.1)


 


Eosinophils # (Auto)


 0.2 x10^3/uL


(0.0-0.7)


 


Basophils # (Auto)


 0.0 x10^3/uL


(0.0-0.2)


 


Sodium Level


 142 mmol/L


(136-145)


 


Potassium Level


 4.4 mmol/L


(3.5-5.1)


 


Chloride Level


 108 mmol/L


()  H


 


Carbon Dioxide Level


 29 mmol/L


(21-32)


 


Anion Gap 5 (6-14)  L


 


Blood Urea Nitrogen


 23 mg/dL


(8-26)


 


Creatinine


 1.2 mg/dL


(0.7-1.3)


 


Estimated GFR


(Cockcroft-Gault) 59.0  





 


BUN/Creatinine Ratio 19 (6-20)  


 


Glucose Level


 91 mg/dL


(70-99)


 


Calcium Level


 8.5 mg/dL


(8.5-10.1)


 


Total Bilirubin


 0.5 mg/dL


(0.2-1.0)


 


Aspartate Amino Transferase


(AST) 16 U/L (15-37)





 


Alanine Aminotransferase (ALT)


 17 U/L (16-63)





 


Alkaline Phosphatase


 80 U/L


()


 


Total Protein


 6.1 g/dL


(6.4-8.2)  L


 


Albumin


 2.9 g/dL


(3.4-5.0)  L


 


Albumin/Globulin Ratio


 0.9 (1.0-1.7)


L











Current Medications:


I have reviewed the current psychotropics carefully including drug interactions.

 Risk benefit ratio favors no change other than as noted in my dictated progress

note.





Diagnosis:


Problems:  


(1) Anxiety disorder, unspecified


(2) MDD (major depressive disorder)











DAVE WAN MD                 Aug 22, 2020 22:02

## 2020-08-23 VITALS — SYSTOLIC BLOOD PRESSURE: 108 MMHG | DIASTOLIC BLOOD PRESSURE: 60 MMHG

## 2020-08-23 VITALS — SYSTOLIC BLOOD PRESSURE: 103 MMHG | DIASTOLIC BLOOD PRESSURE: 59 MMHG

## 2020-08-23 RX ADMIN — VITAMIN D, TAB 1000IU (100/BT) SCH UNIT: 25 TAB at 08:29

## 2020-08-23 RX ADMIN — ASPIRIN SCH MG: 81 TABLET, COATED ORAL at 08:28

## 2020-08-23 RX ADMIN — I-VITE, TAB 1000-60-2MG (60/BT) SCH TAB: TAB at 08:28

## 2020-08-23 NOTE — PDOC
Exam


Note:


Quan Note:


Please also refer to the separate dictated note~for this date of service 

dictated separately.~Patient seen individually. Discussed the patient with 

Nursing staff reviewed the chart.~Reviewed interim history and current 

functioning. Reviewed vital signs,~Labs/ Radiology~and current medications noted

below. Continue current treatment with the changes noted in the dictated 

addendum note





Assessment:


Vital Signs/I&O:





                                   Vital Signs








  Date Time  Temp Pulse Resp B/P (MAP) Pulse Ox O2 Delivery O2 Flow Rate FiO2


 


8/23/20 19:51 98.6    97   


 


8/23/20 16:13  61 18 108/60 (76)    


 


8/22/20 16:16      Room Air  














                                    I & O   


 


 8/22/20 8/22/20 8/23/20





 15:00 23:00 07:00


 


Intake Total 720 ml 360 ml 


 


Balance 720 ml 360 ml 











Current Medications:


I have reviewed the current psychotropics carefully including drug interactions.

 Risk benefit ratio favors no change other than as noted in my dictated progress

note.





Diagnosis:


Problems:  


(1) Anxiety disorder, unspecified


(2) MDD (major depressive disorder)











DAVE WAN MD                 Aug 23, 2020 22:01

## 2020-08-24 VITALS — DIASTOLIC BLOOD PRESSURE: 63 MMHG | SYSTOLIC BLOOD PRESSURE: 132 MMHG

## 2020-08-24 VITALS — SYSTOLIC BLOOD PRESSURE: 116 MMHG | DIASTOLIC BLOOD PRESSURE: 52 MMHG

## 2020-08-24 RX ADMIN — I-VITE, TAB 1000-60-2MG (60/BT) SCH TAB: TAB at 07:52

## 2020-08-24 RX ADMIN — VITAMIN D, TAB 1000IU (100/BT) SCH UNIT: 25 TAB at 07:52

## 2020-08-24 RX ADMIN — ASPIRIN SCH MG: 81 TABLET, COATED ORAL at 07:52

## 2020-08-24 NOTE — PDOC
Exam


Note:


Quan Note:


Please also refer to the separate dictated note~for this date of service 

dictated separately.~Patient seen individually. Discussed the patient with 

Nursing staff reviewed the chart.~Reviewed interim history and current 

functioning. Reviewed vital signs,~Labs/ Radiology~and current medications noted

below. Continue current treatment with the changes noted in the dictated 

addendum note





Assessment:


Vital Signs/I&O:





                                   Vital Signs








  Date Time  Temp Pulse Resp B/P (MAP) Pulse Ox O2 Delivery O2 Flow Rate FiO2


 


8/24/20 16:19 99.2 93 18 132/63 (86) 93   


 


8/22/20 16:16      Room Air  














                                    I & O   


 


 8/23/20 8/23/20 8/24/20





 15:00 23:00 07:00


 


Intake Total 600 ml 360 ml 


 


Balance 600 ml 360 ml 











Current Medications:


I have reviewed the current psychotropics carefully including drug interactions.

 Risk benefit ratio favors no change other than as noted in my dictated progress

note.





Diagnosis:


Problems:  


(1) Anxiety disorder, unspecified


(2) MDD (major depressive disorder)











DAVE WAN MD                 Aug 24, 2020 21:58

## 2020-08-24 NOTE — TX PLAN
Interdisciplinary Tx Plan


Admission Information


Aug 15, 2020 at 10:03


Legal Status (on Admission):  Voluntary


DPOA/Guardian Name:  Pt is his own person


Contact Phone Number:  [phone]


Other Contact Name:  Erika Alberts


Other Contact Phone:  [phone]


Verified Code Status:  Full Code


Allergies:  


Coded Allergies:  


     atorvastatin (Verified  Allergy, Unknown, 8/15/20)


     ezetimibe (Verified  Allergy, Unknown, 8/15/20)





Diagnoses


Primary Diagnosis:  


MDD recurrent, severe


Reasons for Admission:  Depressed, Suicidal ideation, Other


Problem in Patient's Words:  


He has always suffered from depression.  But this  


is the worst I have seen  him


Additional Admission Comments:  


According to the intake, pt is very depressed and  


feels "antsy" all the time.  Pt is having a  


"total mental breakdown", cold sweats, sits in  


the dark, fleeting thoughts of suicide, difficult  


to concentrate, decreased energy.





Problems


Active Problems:  


Withdrawn


flat affect


anxiety


difficulty concentrating


Inactive Problems:  


Medication compliance





Pt Strengths/Limitations


Ability for [place]:  Fair


Cognitive Functioning/Ability:  Fair


Communication Skills/Ability:  Fair


Financial Resources:  Fair


Insight/Judgement:  Fair


Intellectual Ability:  Fair


Physical Health:  Fair


Social Skills:  Fair


Stability in Family:  Good


Stability in School/Work:  Fair


Verbal Skills:  Good





Discharge Criteria


Discharge Criteria:  Able meet basic life need, Able to meet health needs, 

Adequate arrangements @DC, Verbal commit aftercare, Adequate self-care, Improved

behavior, Improved mood/thought





Preliminary Discharge Plan


Preliminary DC Plan:  Current Living Arrange.





Special Precautions


Fall Risk:  Low





Initial D/C Plan





Pt will plan to discharge home with wife and continued [facility]


Identified Discharge Needs:  


Primary Care follow-up


Psychiatrist


Counseling





Currently Utilized Resources


Currently Utilized Resources/P:  


PCP


[facility]


Referrals Community Resources:  


Counseling





Identified Problems/Hx/Goals


Objectives/Short-Term Goals


Short Term Goals:  Decrease Isolation, Dec. Symp. Depression, Medication 

Stabilization, Promote Coping Skill


Short Term Goals in Patient's:  


Decrease suicidality and get out of here so I can get back to normal





Interventions/Frequency


Staff Interventions/Frequency&:  


Psychiatrist to assess pt at least 3x per week.


 to assess pt at least 2x per week.


Nursing to assess behavior, medications and complete 15 minute checks


Encourage group participation or 1:1 engagement based of Activity Dept.


assessment.





History


Vocational History:  


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


Education:  


Pt graduated high school (12th grade)





Community Follow-up





[facility]


Community Provider/Family Inpu:  


Concerns of potential Dementia as he tends to have bizarre behavior around


1500 according to pt wife.





Treatment Plan Explained


Patient/Representative had this treatment plan explained to him/her as indicated

by the signature below and


has been given the opportunity to ask questions and make suggestions:











Date:  





Patient/Representative Signature: _____________________________________________





Status Update


Update


Pt wife participated in tx team via telephone.  Pt is eating 100% of meals and 

sleeping on average 6.75 hours a night.  Pt is calm, cooperative and interactive

with staff.  Pt is coming out of his room more and denying any SI.  Pt wife 

reports that in talking to pt he sounds really good. [facility] and SW will aide in getting those set up prior to pt discharge.  SW will 

speak with pt and pt wife later today and make final discharge plans for this 

week.  Pt is on Abilify 1mg daily and Prozac 30mg daily.











JONATHAN CHILDERS                  Aug 24, 2020 17:44

## 2020-08-25 VITALS — DIASTOLIC BLOOD PRESSURE: 66 MMHG | SYSTOLIC BLOOD PRESSURE: 130 MMHG

## 2020-08-25 VITALS — SYSTOLIC BLOOD PRESSURE: 115 MMHG | DIASTOLIC BLOOD PRESSURE: 56 MMHG

## 2020-08-25 RX ADMIN — ASPIRIN SCH MG: 81 TABLET, COATED ORAL at 07:50

## 2020-08-25 RX ADMIN — VITAMIN D, TAB 1000IU (100/BT) SCH UNIT: 25 TAB at 07:49

## 2020-08-25 RX ADMIN — I-VITE, TAB 1000-60-2MG (60/BT) SCH TAB: TAB at 07:50

## 2020-08-25 NOTE — PDOC
Exam


Note:


Quan Note:


This note is a late entry for 08/23/2020 covers elements not covered in my 

initial note.





Subjective:  The patient was evaluated on telehealth rounds in the evening of 

08/23/2020 with Kapil Schroeder RN.  Nursing report was with Mayte NOGUEIRA.  Overall the 

patient has done better, reading a novel his wife had brought for him.  He is 

appropriate.  Denies suicidal ideation.  Depression is improved.





Review of Systems:  No CV, , pulmonary, eye, ENT system symptoms on review.





Mental Status Exam:  Reasonably oriented.  Speech is coherent.  Abstraction is 

fair.  Computation is impaired.  Language function intact.  Attention span is 

short.  Mood and affect is showing improvement.





Laboratory Data:  Reviewed.





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 

remission.  Rest unchanged.





Plan:  We discussed at length activities he could involve with his car club etc 

post discharge.  Continue psychotropics from initial note.





Assessment:


Vital Signs/I&O:





                                   Vital Signs








  Date Time  Temp Pulse Resp B/P (MAP) Pulse Ox O2 Delivery O2 Flow Rate FiO2


 


8/25/20 05:56 97.5 56 16 115/56 (75) 99   


 


8/22/20 16:16      Room Air  














                                    I & O   


 


 8/24/20 8/24/20 8/25/20





 15:00 23:00 07:00


 


Intake Total 720 ml 240 ml 120 ml


 


Balance 720 ml 240 ml 120 ml











Current Medications:


I have reviewed the current psychotropics carefully including drug interactions.

 Risk benefit ratio favors no change other than as noted in my dictated progress

note.





Diagnosis:


Problems:  


(1) Anxiety disorder, unspecified


(2) MDD (major depressive disorder)


(3) Major depressive disorder with psychotic features











DAVE WAN MD                 Aug 25, 2020 07:48

## 2020-08-25 NOTE — PDOC
Exam


Note:


Quan Note:


This note is a late entry for 08/22/2020 covers elements not covered in my 

initial note.





Subjective:  The patient was evaluated face to face in the evening of 08/22/2020

with Mayte NOGUEIRA.  The patient slept 6-3/4 hours previous night.  He has been 

talking to his wife, wanting to go home.  We had a very lengthy discussion about

activities he can be involved once he returns home including his car clubs 

amongst other things and associating with other friends who are part of his car 

club and [facility].  He has been reading a novel given to 

him by his wife and as I addressed this with him he was well aware of the plot 

and the story and was quite accurate with this.





Review of Systems:  No CV, , pulmonary, eye system symptoms on review.





Mental Status Exam:  Reasonably oriented.  Speech is coherent.  Abstraction is 

fair.  Computation is impaired.  Language function intact.  Mood and affect is 

improved.  No suicidal or homicidal ideation.





Laboratory Data:  Reviewed.





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 

remission.  Rest unchanged.





Plan:  Continue psychotropics from initial note.  Adjust further as clinically 

indicated.





Assessment:


Vital Signs/I&O:





                                   Vital Signs








  Date Time  Temp Pulse Resp B/P (MAP) Pulse Ox O2 Delivery O2 Flow Rate FiO2


 


8/25/20 05:56 97.5 56 16 115/56 (75) 99   


 


8/22/20 16:16      Room Air  














                                    I & O   


 


 8/24/20 8/24/20 8/25/20





 15:00 23:00 07:00


 


Intake Total 720 ml 240 ml 120 ml


 


Balance 720 ml 240 ml 120 ml











Current Medications:


I have reviewed the current psychotropics carefully including drug interactions.

 Risk benefit ratio favors no change other than as noted in my dictated progress

note.





Diagnosis:


Problems:  


(1) Anxiety disorder, unspecified


(2) MDD (major depressive disorder)


(3) Major depressive disorder with psychotic features











DAVE WAN MD                 Aug 25, 2020 07:11

## 2020-08-25 NOTE — PDOC
Exam


Note:


Quan Note:


Please also refer to the separate dictated note~for this date of service 

dictated separately.~Patient seen individually. Discussed the patient with 

Nursing staff reviewed the chart.~Reviewed interim history and current 

functioning. Reviewed vital signs,~Labs/ Radiology~and current medications noted

below. Continue current treatment with the changes noted in the dictated 

addendum note





Assessment:


Vital Signs/I&O:





                                   Vital Signs








  Date Time  Temp Pulse Resp B/P (MAP) Pulse Ox O2 Delivery O2 Flow Rate FiO2


 


8/25/20 16:16 97.6 67 16 130/66 (87) 95 Room Air  














                                    I & O   


 


 8/24/20 8/24/20 8/25/20





 15:00 23:00 07:00


 


Intake Total 720 ml 240 ml 120 ml


 


Balance 720 ml 240 ml 120 ml











Current Medications:


I have reviewed the current psychotropics carefully including drug interactions.

 Risk benefit ratio favors no change other than as noted in my dictated progress

note.





Diagnosis:


Problems:  


(1) Anxiety disorder, unspecified


(2) Major depressive disorder with psychotic features


(3) MDD (major depressive disorder)











DAVE WAN MD                 Aug 25, 2020 22:07

## 2020-08-26 VITALS — DIASTOLIC BLOOD PRESSURE: 52 MMHG | SYSTOLIC BLOOD PRESSURE: 121 MMHG

## 2020-08-26 RX ADMIN — I-VITE, TAB 1000-60-2MG (60/BT) SCH TAB: TAB at 09:12

## 2020-08-26 RX ADMIN — VITAMIN D, TAB 1000IU (100/BT) SCH UNIT: 25 TAB at 09:13

## 2020-08-26 RX ADMIN — ASPIRIN SCH MG: 81 TABLET, COATED ORAL at 09:12

## 2020-08-26 NOTE — PDOC
Exam


Note:


Quan Note:


This note is a late entry for 08/25/2020 covers elements not covered in my 

initial note.





Subjective:  The patient was evaluated on telehealth rounds in the evening of 

08/25/2020 because of the COVID-19 pandemic restrictions with [facility] nursing aid.

 Nursing report was with Mayte NOGUEIRA.  Overall the patient has been appropriate. 

He spends much time in his room reading a book.  He states his mood is better.  

No suicidal or homicidal ideation.





Review of Systems:  No CV, , pulmonary, eye, ENT system symptoms on review.





Mental Status Exam:  Reasonably oriented.  Speech is coherent.  Abstraction is 

fair.  Computation is impaired.  Language function intact.  Mood and affect is 

improved, quite pleasant, smiling, interactive.





Laboratory Data:  Reviewed.





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 

remission.  Rest unchanged.





Plan:  No change from initial note.  The patient will be discharged tomorrow to 

home with outpatient psychiatric and psychological follow up with [facility].





Assessment:


Vital Signs/I&O:





                                   Vital Signs








  Date Time  Temp Pulse Resp B/P (MAP) Pulse Ox O2 Delivery O2 Flow Rate FiO2


 


8/26/20 06:40 97.8 51 18 121/52 (75) 97   


 


8/25/20 16:16      Room Air  














                                    I & O   


 


 8/25/20 8/25/20 8/26/20





 15:00 23:00 07:00


 


Intake Total 840 ml 240 ml 


 


Balance 840 ml 240 ml 











Current Medications:


I have reviewed the current psychotropics carefully including drug interactions.

 Risk benefit ratio favors no change other than as noted in my dictated progress

note.





Diagnosis:


Problems:  


(1) Anxiety disorder, unspecified


(2) Major depressive disorder with psychotic features


(3) MDD (major depressive disorder)











DAVE WAN MD                 Aug 26, 2020 07:32

## 2020-08-26 NOTE — PDOC
Exam


Note:


Quan Note:


This note is a late entry for 08/24/2020 covers elements not covered in my 

initial note.





Subjective:  The patient was reviewed face to face in the morning of 08/24/2020 

with treatment team meeting with Pierre (social service staff), 

Allyson, activity therapy, Mayte NOGUEIRA, and patients wife Erika.  The patient 

slept 7 hours previous night.  He has been pleasant, cooperative.  Mood is 

better.  No suicidal ideation.  He has been reading some books that his wife had

left for him.





Review of Systems:  No CV, , pulmonary, eye, ENT system symptoms on review.  

Reliability fair.  





Mental Status Exam:  The patient is alert and oriented, quite verbal and 

interactive.  He was able to describe the plot of the book he was reading.  

Abstraction is fair.  Computation is impaired.  Language function intact.  

Attention span is short.  Mood and affect better.





Laboratory Data:  Reviewed.





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 

remission.  Rest unchanged.





Plan:  No change from initial note.





Assessment:


Vital Signs/I&O:





                                   Vital Signs








  Date Time  Temp Pulse Resp B/P (MAP) Pulse Ox O2 Delivery O2 Flow Rate FiO2


 


8/26/20 06:40 97.8 51 18 121/52 (75) 97   


 


8/25/20 16:16      Room Air  














                                    I & O   


 


 8/25/20 8/25/20 8/26/20





 15:00 23:00 07:00


 


Intake Total 840 ml 240 ml 


 


Balance 840 ml 240 ml 











Current Medications:


I have reviewed the current psychotropics carefully including drug interactions.

 Risk benefit ratio favors no change other than as noted in my dictated progress

note.





Diagnosis:


Problems:  


(1) Anxiety disorder, unspecified


(2) Major depressive disorder with psychotic features


(3) MDD (major depressive disorder)











DAVE WAN MD                 Aug 26, 2020 07:19

## 2020-08-26 NOTE — PDOC
Exam


Note:


Quan Note:


Please also refer to the separate dictated note~for this date of service 

dictated separately.~Patient seen individually. Discussed the patient with 

Nursing staff reviewed the chart.~Reviewed interim history and current 

functioning. Reviewed vital signs,~Labs/ Radiology~and current medications noted

below. Continue current treatment with the changes noted in the dictated 

addendum note





Assessment:


Vital Signs/I&O:





                                   Vital Signs








  Date Time  Temp Pulse Resp B/P (MAP) Pulse Ox O2 Delivery O2 Flow Rate FiO2


 


8/26/20 06:40 97.8 51 18 121/52 (75) 97   


 


8/25/20 16:16      Room Air  














                                    I & O   


 


 8/25/20 8/25/20 8/26/20





 15:00 23:00 07:00


 


Intake Total 840 ml 240 ml 


 


Balance 840 ml 240 ml 











Current Medications:


I have reviewed the current psychotropics carefully including drug interactions.

 Risk benefit ratio favors no change other than as noted in my dictated progress

note.





Diagnosis:


Problems:  


(1) Anxiety disorder, unspecified


(2) Major depressive disorder with psychotic features


(3) MDD (major depressive disorder)











DAVE WAN MD                 Aug 26, 2020 22:08

## 2020-08-27 NOTE — DS
DATE OF DISCHARGE:  08/26/2020



DISCHARGE SUMMARY/PSYCHIATRIC PROGRESS NOTE



This late entry 08/26/2020 covers elements not covered in my initial note.



REASON FOR ADMISSION:  Please refer to the admission history for details. 

Briefly, the patient is a 74-year-old   male referred to us from

the [facility] Psychiatry Service on account of worsening symptoms

of depression, worsening anxiety, feeling "antsy" all the time.  He was

reportedly having a "total mental breakdown."  He was having cold sweats

sitting in the dark, having fleeting suicidal thoughts, difficulty

concentrating, isolative, withdrawn, depressed, hopeless, helpless, and

worthless.  He had failed outpatient psychiatric interventions resulting in this

referral.



SIGNIFICANT FINDINGS AND CLINICAL COURSE:  Following admission, the patient was

seen daily individually by myself from a psychiatric standpoint, medical

followup per Dr. Salgado/Dr. Rodriguez.  The patient was felt to have akathisia and

Abilify was reduced down to 1 mg a day, Prozac increased gradually to 30 mg a

day, maintained on Ativan 0.5 mg b.i.d. for anxiety.  Gradually mood appeared to

improve.  He was more interactive, verbal.  Denied suicidal ideation.  Wife

confirmed all of this during telephone conversations.



REVIEW OF SYSTEMS:  Prior to discharge on 08/26/2020, no CV, , pulmonary, eye,

ENT system symptoms on review.



MENTAL STATUS EXAM:  Reasonably oriented.  Speech is coherent, abstraction fair,

computation impaired, language function intact, attention span fair.  Mood and

affect is improved.  No suicidal ideation.



LABORATORY DATA:  Reviewed.



IMPRESSION:  Major depressive disorder with psychotic features in partial

remission; anxiety disorder, unspecified.  Rest unchanged from admission.



DISCHARGE MEDICATIONS:  Please refer to the MRAD.



DISCHARGE INSTRUCTIONS:  Outpatient psychiatric and medical followup at the

[facility].



Time for discharge to management greater than 30 minutes.





______________________________

DAVE WAN MD



DR:  ESPERANZA/[place]  JOB#:  [id number] / [id number]

DD:  08/27/2020 23:14  DT:  08/27/2020 23:27